# Patient Record
Sex: FEMALE | Race: WHITE | NOT HISPANIC OR LATINO | Employment: FULL TIME | URBAN - METROPOLITAN AREA
[De-identification: names, ages, dates, MRNs, and addresses within clinical notes are randomized per-mention and may not be internally consistent; named-entity substitution may affect disease eponyms.]

---

## 2018-04-05 ENCOUNTER — HOSPITAL ENCOUNTER (OUTPATIENT)
Dept: LAB | Facility: MEDICAL CENTER | Age: 28
End: 2018-04-05
Attending: INTERNAL MEDICINE

## 2018-04-05 LAB
ALBUMIN SERPL BCP-MCNC: 5.3 G/DL (ref 3.2–4.9)
ALBUMIN/GLOB SERPL: 1.5 G/DL
ALP SERPL-CCNC: 50 U/L (ref 30–99)
ALT SERPL-CCNC: 29 U/L (ref 2–50)
ANION GAP SERPL CALC-SCNC: 12 MMOL/L (ref 0–11.9)
AST SERPL-CCNC: 42 U/L (ref 12–45)
BASOPHILS # BLD AUTO: 0.4 % (ref 0–1.8)
BASOPHILS # BLD: 0.04 K/UL (ref 0–0.12)
BILIRUB SERPL-MCNC: 0.9 MG/DL (ref 0.1–1.5)
BUN SERPL-MCNC: 18 MG/DL (ref 8–22)
CALCIUM SERPL-MCNC: 10.3 MG/DL (ref 8.5–10.5)
CHLORIDE SERPL-SCNC: 101 MMOL/L (ref 96–112)
CHOLEST SERPL-MCNC: 180 MG/DL (ref 100–199)
CO2 SERPL-SCNC: 23 MMOL/L (ref 20–33)
CREAT SERPL-MCNC: 0.88 MG/DL (ref 0.5–1.4)
EOSINOPHIL # BLD AUTO: 0.05 K/UL (ref 0–0.51)
EOSINOPHIL NFR BLD: 0.6 % (ref 0–6.9)
ERYTHROCYTE [DISTWIDTH] IN BLOOD BY AUTOMATED COUNT: 41.8 FL (ref 35.9–50)
GLOBULIN SER CALC-MCNC: 3.5 G/DL (ref 1.9–3.5)
GLUCOSE SERPL-MCNC: 64 MG/DL (ref 65–99)
HCT VFR BLD AUTO: 48.2 % (ref 37–47)
HDLC SERPL-MCNC: 60 MG/DL
HGB BLD-MCNC: 16.1 G/DL (ref 12–16)
IMM GRANULOCYTES # BLD AUTO: 0.03 K/UL (ref 0–0.11)
IMM GRANULOCYTES NFR BLD AUTO: 0.3 % (ref 0–0.9)
LDLC SERPL CALC-MCNC: 106 MG/DL
LYMPHOCYTES # BLD AUTO: 1.18 K/UL (ref 1–4.8)
LYMPHOCYTES NFR BLD: 13 % (ref 22–41)
MCH RBC QN AUTO: 31.1 PG (ref 27–33)
MCHC RBC AUTO-ENTMCNC: 33.4 G/DL (ref 33.6–35)
MCV RBC AUTO: 93.1 FL (ref 81.4–97.8)
MONOCYTES # BLD AUTO: 0.42 K/UL (ref 0–0.85)
MONOCYTES NFR BLD AUTO: 4.6 % (ref 0–13.4)
NEUTROPHILS # BLD AUTO: 7.37 K/UL (ref 2–7.15)
NEUTROPHILS NFR BLD: 81.1 % (ref 44–72)
NRBC # BLD AUTO: 0 K/UL
NRBC BLD-RTO: 0 /100 WBC
PLATELET # BLD AUTO: 258 K/UL (ref 164–446)
PMV BLD AUTO: 9.3 FL (ref 9–12.9)
POTASSIUM SERPL-SCNC: 4.7 MMOL/L (ref 3.6–5.5)
PROT SERPL-MCNC: 8.8 G/DL (ref 6–8.2)
RBC # BLD AUTO: 5.18 M/UL (ref 4.2–5.4)
SODIUM SERPL-SCNC: 136 MMOL/L (ref 135–145)
TRIGL SERPL-MCNC: 72 MG/DL (ref 0–149)
TSH SERPL DL<=0.005 MIU/L-ACNC: 1.62 UIU/ML (ref 0.38–5.33)
WBC # BLD AUTO: 9.1 K/UL (ref 4.8–10.8)

## 2018-04-05 PROCEDURE — 36415 COLL VENOUS BLD VENIPUNCTURE: CPT

## 2018-04-05 PROCEDURE — 80053 COMPREHEN METABOLIC PANEL: CPT

## 2018-04-05 PROCEDURE — 85025 COMPLETE CBC W/AUTO DIFF WBC: CPT

## 2018-04-05 PROCEDURE — 84443 ASSAY THYROID STIM HORMONE: CPT

## 2018-04-05 PROCEDURE — 80061 LIPID PANEL: CPT

## 2019-01-12 ENCOUNTER — OFFICE VISIT (OUTPATIENT)
Dept: URGENT CARE | Facility: PHYSICIAN GROUP | Age: 29
End: 2019-01-12
Payer: COMMERCIAL

## 2019-01-12 VITALS
HEART RATE: 64 BPM | WEIGHT: 150 LBS | RESPIRATION RATE: 16 BRPM | OXYGEN SATURATION: 96 % | TEMPERATURE: 99.1 F | HEIGHT: 67 IN | BODY MASS INDEX: 23.54 KG/M2 | DIASTOLIC BLOOD PRESSURE: 68 MMHG | SYSTOLIC BLOOD PRESSURE: 110 MMHG

## 2019-01-12 DIAGNOSIS — N75.1 BARTHOLIN'S GLAND ABSCESS: ICD-10-CM

## 2019-01-12 PROCEDURE — 99214 OFFICE O/P EST MOD 30 MIN: CPT | Performed by: PHYSICIAN ASSISTANT

## 2019-01-12 RX ORDER — AMOXICILLIN 500 MG/1
500 CAPSULE ORAL 3 TIMES DAILY
COMMUNITY
End: 2019-03-08

## 2019-01-12 RX ORDER — SULFAMETHOXAZOLE AND TRIMETHOPRIM 800; 160 MG/1; MG/1
1 TABLET ORAL 2 TIMES DAILY
Qty: 14 TAB | Refills: 0 | Status: SHIPPED | OUTPATIENT
Start: 2019-01-12 | End: 2019-01-19

## 2019-01-14 ENCOUNTER — HOSPITAL ENCOUNTER (EMERGENCY)
Facility: MEDICAL CENTER | Age: 29
End: 2019-01-14
Attending: EMERGENCY MEDICINE
Payer: COMMERCIAL

## 2019-01-14 VITALS
HEART RATE: 60 BPM | RESPIRATION RATE: 16 BRPM | WEIGHT: 150 LBS | DIASTOLIC BLOOD PRESSURE: 78 MMHG | BODY MASS INDEX: 23.85 KG/M2 | TEMPERATURE: 96.9 F | SYSTOLIC BLOOD PRESSURE: 122 MMHG | OXYGEN SATURATION: 99 %

## 2019-01-14 DIAGNOSIS — Z76.89 ENCOUNTER FOR INCISION AND DRAINAGE PROCEDURE: ICD-10-CM

## 2019-01-14 DIAGNOSIS — N75.0 INFECTED CYST OF BARTHOLIN'S GLAND DUCT: ICD-10-CM

## 2019-01-14 LAB — HCG UR QL: NEGATIVE

## 2019-01-14 PROCEDURE — 303977 HCHG I & D

## 2019-01-14 PROCEDURE — 81025 URINE PREGNANCY TEST: CPT

## 2019-01-14 PROCEDURE — 700101 HCHG RX REV CODE 250: Performed by: EMERGENCY MEDICINE

## 2019-01-14 PROCEDURE — A9270 NON-COVERED ITEM OR SERVICE: HCPCS | Performed by: EMERGENCY MEDICINE

## 2019-01-14 PROCEDURE — 99284 EMERGENCY DEPT VISIT MOD MDM: CPT

## 2019-01-14 PROCEDURE — 700102 HCHG RX REV CODE 250 W/ 637 OVERRIDE(OP): Performed by: EMERGENCY MEDICINE

## 2019-01-14 RX ORDER — HYDROCODONE BITARTRATE AND ACETAMINOPHEN 5; 325 MG/1; MG/1
1 TABLET ORAL EVERY 4 HOURS PRN
Qty: 10 TAB | Refills: 0 | Status: SHIPPED | OUTPATIENT
Start: 2019-01-14 | End: 2019-01-18

## 2019-01-14 RX ORDER — IBUPROFEN 600 MG/1
600 TABLET ORAL ONCE
Status: COMPLETED | OUTPATIENT
Start: 2019-01-14 | End: 2019-01-14

## 2019-01-14 RX ORDER — CEPHALEXIN 500 MG/1
500 CAPSULE ORAL 2 TIMES DAILY
Qty: 20 CAP | Refills: 0 | Status: SHIPPED | OUTPATIENT
Start: 2019-01-14 | End: 2019-01-24

## 2019-01-14 RX ORDER — LIDOCAINE HYDROCHLORIDE 20 MG/ML
20 INJECTION, SOLUTION INFILTRATION; PERINEURAL ONCE
Status: COMPLETED | OUTPATIENT
Start: 2019-01-14 | End: 2019-01-14

## 2019-01-14 RX ORDER — OXYCODONE HYDROCHLORIDE AND ACETAMINOPHEN 5; 325 MG/1; MG/1
1 TABLET ORAL ONCE
Status: COMPLETED | OUTPATIENT
Start: 2019-01-14 | End: 2019-01-14

## 2019-01-14 RX ADMIN — LIDOCAINE HYDROCHLORIDE 20 ML: 20 INJECTION, SOLUTION INFILTRATION; PERINEURAL at 11:53

## 2019-01-14 RX ADMIN — OXYCODONE AND ACETAMINOPHEN 1 TABLET: 5; 325 TABLET ORAL at 12:25

## 2019-01-14 RX ADMIN — IBUPROFEN 600 MG: 600 TABLET, FILM COATED ORAL at 12:03

## 2019-01-14 ASSESSMENT — ENCOUNTER SYMPTOMS
NAUSEA: 0
COUGH: 0
CHILLS: 0
FEVER: 0
HEADACHES: 0
MYALGIAS: 0
ABDOMINAL PAIN: 0
TINGLING: 0
PALPITATIONS: 0
SHORTNESS OF BREATH: 0
BLURRED VISION: 0
VOMITING: 0
SORE THROAT: 0
CONSTIPATION: 0
EYE PAIN: 0
DIARRHEA: 0

## 2019-01-14 NOTE — ED PROVIDER NOTES
"ED Provider Note    CHIEF COMPLAINT  Chief Complaint   Patient presents with   • Abscess     \"I have a Bartholin cyst on my left side\"       HPI  Indu Callejas is a 28 y.o. female who presents for evaluation of vaginal cyst.  Patient relates past history of Bartholin cyst with development of an abscess.  She states over the last several days she developed increased pain and swelling in the left vaginal area.  Patient had no associated: Fever, chills, URI symptoms, cardiorespiratory symptoms, gastrointestinal symptoms.  She does not believe she is pregnant.  No other acute symptomatology or complaints.    REVIEW OF SYSTEMS  See HPI for further details.  She denies a history of: Diabetes, seizures, cardiopulmonary disorders, gastrointestinal disorders.  Patient has had a previous Bartholin cyst drained but did not have a Word catheter placed.  All other systems negative.    PAST MEDICAL HISTORY  Past Medical History:   Diagnosis Date   • HTN (hypertension) 10/2/2014   • Other specified symptom associated with female genital organs     dermoid cyst   • Pain     pelvis       FAMILY HISTORY  Family History   Problem Relation Age of Onset   • Hypertension Mother        SOCIAL HISTORY  Non-smoker; occasional alcohol use; positive marijuana use;    SURGICAL HISTORY  Past Surgical History:   Procedure Laterality Date   • PELVISCOPY  2/26/2010    [facility]   • OVARIAN CYSTECTOMY  2/26/2010    [facility]   • DILATION AND EVACUATION  2010   • ABDOMINAL EXPLORATION      Laparoscopic ovarian cystectomy for dermoid       CURRENT MEDICATIONS  Home Medications     Reviewed by Cony Abraham R.N. (Registered Nurse) on 01/14/19 at 1026  Med List Status: Not Addressed   Medication Last Dose Status   amoxicillin (AMOXIL) 500 MG Cap  Active   levonorgestrel-ethinyl estradiol (NORDETTE) 0.15-30 MG-MCG per tablet  Active "   sulfamethoxazole-trimethoprim (BACTRIM DS) 800-160 MG tablet  Active   valacyclovir (VALTREX) 1 GM Tab  Active                ALLERGIES  Allergies   Allergen Reactions   • Aluminum      All metals   • Cat Hair Extract    • Other Food      Cherries, apples   • Other Misc      dogs   • Soap        PHYSICAL EXAM  VITAL SIGNS: /74   Pulse 66   Temp 36.1 °C (96.9 °F) (Temporal)   Resp 14   Wt 68 kg (150 lb)   SpO2 100%   BMI 23.85 kg/m²    Constitutional: Well developed, Well nourished, No acute distress, Non-toxic appearance.   HENT: Normocephalic, Atraumatic, Nares:Clear, Oropharynx: moist, well hydrated, posterior pharynx:clear   Eyes: PERRL, EOMI, Conjunctiva normal, No discharge.   Neck: Normal range of motion, No tenderness, Supple, No stridor.   Lymphatic: No lymphadenopathy noted.   Cardiovascular: Regular rate and rhythm without mumurs, gallups, rubs   Thorax & Lungs: Normal Equal breath sounds, No respiratory distress, No wheezing, no stridor, no rales. No chest tenderness.   Abdomen: Soft, nontender, nondistended, no organomegaly, positive bowel sounds normal in quality. No guarding or rebound.  Vaginal examination performed with a female escort at the bedside; the patient has a large area of erythema, edema, round fluctuance over the left perivaginal region consistent with an infected Bartholin cyst abscess;  Skin: Good skin turgor, pink, warm, dry. No rashes, petechiae, purpura. Normal capillary refill.   Back: No tenderness, No CVA tenderness.   Neurologic: Alert & oriented x 3,  No gross focal deficits noted.   Psychiatric: Affect normal, Judgment normal, Mood normal.       RADIOLOGY/PROCEDURES  1.  Incision and drainage of Bartholin cyst abscess    COURSE & MEDICAL DECISION MAKING  Pertinent Labs & Imaging studies reviewed. (See chart for details)  Procedure note: The patient's vaginal area was prepped and draped in a sterile fashion.  The skin was cleansed with Betadine prep over the large  swollen fluctuant area.  The skin was anesthetized with 2% lidocaine.  Using #11 blade an incision was made over the mucosal surface.  Large amount of purulent material was expressed.  A Word catheter was placed and inflated and secured without any complications.    Discussion: At this time, patient presents with a Bartholin cyst abscess.  Treatment was initiated as noted above.  At this time, I discussed the findings and treatment plan with patient.  Patient is to follow-up with primary care or follow-up with gynecology on call.  Explained the need through have the catheter stay in place for an extended period of time to decrease the chance of reaccumulation and to allow for epithelialization of the Bartholin cyst cavity.  Patient indicates she is comfortable with this explanation disposition.    FINAL IMPRESSION  1. Infected cyst of Bartholin's gland duct    2. Encounter for incision and drainage procedure           PLAN  1.  Appropriate discharge instructions given  2.  Keflex 500 mg #20  3.  Lortab 5 mg #10;  databank reviewed; informed consent obtained;  4.  Follow-up with gynecology    Electronically signed by: Guy G Gansert, 1/14/2019 11:09 AM

## 2019-01-14 NOTE — PROGRESS NOTES
Subjective:      Indu Callejas is a 28 y.o. female who presents with Cyst (x 2 weeks// R upper thigh// pain )      HPI:  This is a new problem. Indu Callejas is a 28 y.o. female who presents today for evaluation of a left Bartholin's cyst/abscess.  Patient states that she has had these in the past, mainly on the right side but reports that she has had on the left before.  She said she has had it drained once before 2 years ago.  She said she started to notice it about 2 weeks ago when the last couple days it is got increasingly more painful.  She has been doing sitz baths at home but reports that has not provided much relief.  She said when she had it drained a couple years ago they never placed anything in it or do anything to prevent recurrence.  She denies any drainage, fever/chills, nausea/vomiting, chest pain, or shortness of breath.        Review of Systems   Constitutional: Negative for chills and fever.   HENT: Negative for congestion and sore throat.    Eyes: Negative for blurred vision and pain.   Respiratory: Negative for cough and shortness of breath.    Cardiovascular: Negative for chest pain and palpitations.   Gastrointestinal: Negative for abdominal pain, constipation, diarrhea, nausea and vomiting.   Genitourinary:        Bartholin cyst   Musculoskeletal: Negative for myalgias.   Neurological: Negative for tingling and headaches.       PMH:  has a past medical history of HTN (hypertension) (10/2/2014); Other specified symptom associated with female genital organs; and Pain. She also has no past medical history of Anemia; Blood transfusion; Clotting disorder ([facility]); Headache(784.0); IBD (inflammatory bowel disease); Migraine; Substance abuse ([facility]); or UTI of .  MEDS:   Current Outpatient Prescriptions:   •  amoxicillin (AMOXIL) 500 MG Cap, Take 500 mg by mouth 3 times a day., Disp: , Rfl:   •  sulfamethoxazole-trimethoprim (BACTRIM DS) 800-160 MG tablet, Take 1 Tab by mouth 2 times a day  "for 7 days., Disp: 14 Tab, Rfl: 0  •  valacyclovir (VALTREX) 1 GM Tab, Take 1 Tab by mouth 1 time daily as needed. (Patient not taking: Reported on 1/12/2019), Disp: 30 Tab, Rfl: 6  •  levonorgestrel-ethinyl estradiol (NORDETTE) 0.15-30 MG-MCG per tablet, Take 1 Tab by mouth every day. (Patient not taking: Reported on 1/12/2019), Disp: 28 Tab, Rfl: 7  ALLERGIES:   Allergies   Allergen Reactions   • Aluminum      All metals   • Cat Hair Extract    • Other Food      Cherries, apples   • Other Misc      dogs   • Soap      SURGHX:   Past Surgical History:   Procedure Laterality Date   • PELVISCOPY  2/26/2010    Performed by MIRNA VICTOR at [facility]   • OVARIAN CYSTECTOMY  2/26/2010    Performed by MIRNA VICTOR at [facility]   • DILATION AND EVACUATION  2010   • ABDOMINAL EXPLORATION      Laparoscopic ovarian cystectomy for dermoid     SOCHX:  reports that she has never smoked. She has never used smokeless tobacco. She reports that she drinks alcohol. She reports that she uses drugs, including Marijuana.  FH: Family history was reviewed, no pertinent findings to report'   Objective:     /68 (BP Location: Right arm, Patient Position: Sitting)   Pulse 64   Temp 37.3 °C (99.1 °F) (Temporal)   Resp 16   Ht 1.689 m (5' 6.5\")   Wt 68 kg (150 lb)   SpO2 96%   BMI 23.85 kg/m²      Physical Exam   Constitutional: She is oriented to person, place, and time. She appears well-developed and well-nourished.   HENT:   Head: Normocephalic and atraumatic.   Right Ear: External ear normal.   Left Ear: External ear normal.   Eyes: Pupils are equal, round, and reactive to light. Conjunctivae are normal.   Neck: Normal range of motion.   Cardiovascular: Normal rate, regular rhythm and normal heart sounds.    No murmur heard.  Pulmonary/Chest: Effort normal and breath sounds normal. She has no wheezes.   Genitourinary: Pelvic exam was performed with patient supine.   Genitourinary " Comments: Left bartholin gland abscess with fluctuance. No drainage noted   Lymphadenopathy:     She has no cervical adenopathy.   Neurological: She is alert and oriented to person, place, and time.   Skin: Skin is warm and dry. Capillary refill takes less than 2 seconds.   Psychiatric: She has a normal mood and affect. Her behavior is normal. Judgment normal.        Assessment/Plan:     1. Bartholin's gland abscess  - sulfamethoxazole-trimethoprim (BACTRIM DS) 800-160 MG tablet; Take 1 Tab by mouth 2 times a day for 7 days.  Dispense: 14 Tab; Refill: 0  -Discussed with patient that due to the fact that she has had a recurrence of this she should have it drained properly and have a Word catheter placed.  Advised that she should call her gynecologist to make an appointment with them for first thing next week or if her symptoms should worsen in the meantime she can go to the emergency department.      Differential Diagnosis, natural history, and supportive care discussed. Return to the Urgent Care or follow up with your PCP if symptoms fail to resolve, or for any new or worsening symptoms. Emergency room precautions discussed. Patient and/or family appears understanding of information.

## 2019-01-14 NOTE — ED NOTES
Pt arrives to triage via wheelchair with c/c Bartholin cyst x2 weeks.  Pt reports history of.  A&ox4.  Pt to lobby & advised to inform RN of any changes.

## 2019-01-30 ENCOUNTER — GYNECOLOGY VISIT (OUTPATIENT)
Dept: OBGYN | Facility: CLINIC | Age: 29
End: 2019-01-30
Payer: COMMERCIAL

## 2019-01-30 ENCOUNTER — HOSPITAL ENCOUNTER (OUTPATIENT)
Facility: MEDICAL CENTER | Age: 29
End: 2019-01-30
Attending: OBSTETRICS & GYNECOLOGY
Payer: COMMERCIAL

## 2019-01-30 VITALS
BODY MASS INDEX: 24.59 KG/M2 | HEIGHT: 66 IN | DIASTOLIC BLOOD PRESSURE: 66 MMHG | WEIGHT: 153 LBS | SYSTOLIC BLOOD PRESSURE: 108 MMHG

## 2019-01-30 DIAGNOSIS — Z12.4 SCREENING FOR CERVICAL CANCER: ICD-10-CM

## 2019-01-30 DIAGNOSIS — Z01.419 WELL WOMAN EXAM: ICD-10-CM

## 2019-01-30 DIAGNOSIS — Z11.51 SPECIAL SCREENING EXAMINATION FOR HUMAN PAPILLOMAVIRUS (HPV): ICD-10-CM

## 2019-01-30 DIAGNOSIS — Z32.01 POSITIVE PREGNANCY TEST: ICD-10-CM

## 2019-01-30 LAB
INT CON NEG: NEGATIVE
INT CON POS: POSITIVE
POC URINE PREGNANCY TEST: POSITIVE

## 2019-01-30 PROCEDURE — 87491 CHLMYD TRACH DNA AMP PROBE: CPT

## 2019-01-30 PROCEDURE — 88175 CYTOPATH C/V AUTO FLUID REDO: CPT

## 2019-01-30 PROCEDURE — 81025 URINE PREGNANCY TEST: CPT | Performed by: OBSTETRICS & GYNECOLOGY

## 2019-01-30 PROCEDURE — 87624 HPV HI-RISK TYP POOLED RSLT: CPT

## 2019-01-30 PROCEDURE — 99395 PREV VISIT EST AGE 18-39: CPT | Performed by: OBSTETRICS & GYNECOLOGY

## 2019-01-30 PROCEDURE — 87591 N.GONORRHOEAE DNA AMP PROB: CPT

## 2019-01-30 NOTE — PROGRESS NOTES
"Annual examination;  This patient is a 28 y.o. female  using nothing for birth control.        /66 (BP Location: Right arm, Patient Position: Sitting)   Ht 1.676 m (5' 6\")   Wt 69.4 kg (153 lb)   BMI 24.69 kg/m²     Physical examination;  Breast examination- No dominant masses, No skin retraction, No axillary adenopathy    Pelvic examination;  External genitalia-No visible lesions  Vagina-No blood or discharge  Cervix-No gross lesions, Pap smear taken  Uterus-Normal size and shape,  No tenderness  Adnexa No mass or tenderness    Urine hCG-positive    Impression;  Normal annual    Plan;  Needs DUB appointment in 1 month  Check PAP  Start Mammogram age 40 yrs.    "

## 2019-01-31 DIAGNOSIS — Z12.4 SCREENING FOR CERVICAL CANCER: ICD-10-CM

## 2019-01-31 DIAGNOSIS — Z01.419 WELL WOMAN EXAM: ICD-10-CM

## 2019-01-31 DIAGNOSIS — Z11.51 SPECIAL SCREENING EXAMINATION FOR HUMAN PAPILLOMAVIRUS (HPV): ICD-10-CM

## 2019-02-01 LAB
C TRACH DNA GENITAL QL NAA+PROBE: NEGATIVE
CYTOLOGY REG CYTOL: ABNORMAL
HPV HR 12 DNA CVX QL NAA+PROBE: POSITIVE
HPV16 DNA SPEC QL NAA+PROBE: NEGATIVE
HPV18 DNA SPEC QL NAA+PROBE: NEGATIVE
N GONORRHOEA DNA GENITAL QL NAA+PROBE: NEGATIVE
SPECIMEN SOURCE: ABNORMAL
SPECIMEN SOURCE: ABNORMAL

## 2019-02-06 ENCOUNTER — TELEPHONE (OUTPATIENT)
Dept: OBGYN | Facility: CLINIC | Age: 29
End: 2019-02-06

## 2019-02-06 NOTE — TELEPHONE ENCOUNTER
----- Message from Jaymie Marin sent at 2/6/2019 10:38 AM PST -----  Regarding: test results of pap  Contact: [phone]  She would like you to call with her test results of her Pap    Consulted w/Dr. Casanova, states pt needs to get a colposcopy due to abnormal cells, it won't affect pregnancy. Write down questions if any for her next appt.    Unable to contact pt, msg left to call back.    2/7/19 @ 0903 Unable to contact pt, msg left to call back.

## 2019-02-26 ENCOUNTER — GYNECOLOGY VISIT (OUTPATIENT)
Dept: OBGYN | Facility: CLINIC | Age: 29
End: 2019-02-26
Payer: COMMERCIAL

## 2019-02-26 VITALS
WEIGHT: 153 LBS | BODY MASS INDEX: 24.59 KG/M2 | HEIGHT: 66 IN | DIASTOLIC BLOOD PRESSURE: 62 MMHG | SYSTOLIC BLOOD PRESSURE: 124 MMHG

## 2019-02-26 DIAGNOSIS — N93.8 DUB (DYSFUNCTIONAL UTERINE BLEEDING): ICD-10-CM

## 2019-02-26 PROCEDURE — 76830 TRANSVAGINAL US NON-OB: CPT | Performed by: OBSTETRICS & GYNECOLOGY

## 2019-02-26 PROCEDURE — 99214 OFFICE O/P EST MOD 30 MIN: CPT | Mod: 25 | Performed by: OBSTETRICS & GYNECOLOGY

## 2019-02-26 NOTE — PROGRESS NOTES
Chief complaint;  This patient is a 28 y.o. female , No LMP recorded. presents complaining of dysfunctional uterine bleeding.    Review of systems-denies; chest pain, shortness of breath, fever, chills, abdominal pain  Past OB history-  OB History    Para Term  AB Living   1       1     SAB TAB Ectopic Molar Multiple Live Births                    # Outcome Date GA Lbr Felipe/2nd Weight Sex Delivery Anes PTL Lv   1 AB                 Past surgical history-   Past Surgical History:   Procedure Laterality Date   • PELVISCOPY  2010    [facility]   • OVARIAN CYSTECTOMY  2010    [facility]   • DILATION AND EVACUATION     • ABDOMINAL EXPLORATION      Laparoscopic ovarian cystectomy for dermoid     Past medical history-   Past Medical History:   Diagnosis Date   • HTN (hypertension) 10/2/2014   • Other specified symptom associated with female genital organs     dermoid cyst   • Pain     pelvis     Allergies- Aluminum; Cat hair extract; Other food; Other misc; and Soap  Present medications-   Outpatient Encounter Prescriptions as of 2019   Medication Sig Dispense Refill   • amoxicillin (AMOXIL) 500 MG Cap Take 500 mg by mouth 3 times a day.     • valacyclovir (VALTREX) 1 GM Tab Take 1 Tab by mouth 1 time daily as needed. (Patient not taking: Reported on 2019) 30 Tab 6   • levonorgestrel-ethinyl estradiol (NORDETTE) 0.15-30 MG-MCG per tablet Take 1 Tab by mouth every day. (Patient not taking: Reported on 2019) 28 Tab 7     No facility-administered encounter medications on file as of 2019.      Family history- no history of breast or ovarian cancer  Social history-   Social History     Social History   • Marital status: Single     Spouse name: N/A   • Number of children: N/A   • Years of education: N/A     Occupational History   • Not on file.     Social History Main  "Topics   • Smoking status: Never Smoker   • Smokeless tobacco: Never Used   • Alcohol use Yes      Comment: rare   • Drug use: Yes     Types: Marijuana   • Sexual activity: Yes     Partners: Male     Birth control/ protection: Pill     Other Topics Concern   • Not on file     Social History Narrative   • No narrative on file         Physical examination;   Alert and oriented x3  General-well-developed well-nourished female in no apparent distress  Vitals:    02/26/19 0839   BP: 124/62   Weight: 69.4 kg (153 lb)   Height: 1.676 m (5' 6\")     Skin is warm and dry   HEENT-normocephalic,nontraumatic,PERRLA,EOMI  Throat- no edema or erythema  Neck-supple  Cardiovascular-regular rate and rhythm, normal S1-S2 without murmurs or gallops  Lungs-clear to auscultation bilaterally  Back-negative CVA tenderness  Abdomen-nondistended positive bowel sounds soft nontender without masses or hepatosplenomegaly  Pelvic examination;  External genitalia-without lesions  Vagina-no blood, no discharge  Cervix-closed,no gross lesions  Uterus-  8 weeks size, nontender  Adnexa- without mass or tenderness  Extremities-without cyanosis clubbing or edema  Neurologic-grossly intact    Transvaginal ultrasound; as performed and read by myself; intrauterine gestational sac containing yin pregnancy positive fetal and cardiac motion crown-rump length consistent with 8 weeks 5 days, EDC 10/3/19, no free pelvic fluid, no obvious adnexal masses    Impression;  Dysfunctional uterine bleeding-no evidence of ectopic or missed    Plan;   Needs follow-up in 2 weeks      30 Minutes total spent with the patient in face-to-face contact,5 minutes of total time utilized to perform  Ultrasound, greater than 50% of the time has been spent on counseling and coordination of care. Many questions answered regarding possible miscarriage.      "

## 2019-03-07 ENCOUNTER — APPOINTMENT (OUTPATIENT)
Dept: OBGYN | Facility: CLINIC | Age: 29
End: 2019-03-07
Payer: COMMERCIAL

## 2019-03-08 ENCOUNTER — INITIAL PRENATAL (OUTPATIENT)
Dept: OBGYN | Facility: CLINIC | Age: 29
End: 2019-03-08
Payer: COMMERCIAL

## 2019-03-08 ENCOUNTER — HOSPITAL ENCOUNTER (OUTPATIENT)
Dept: LAB | Facility: MEDICAL CENTER | Age: 29
End: 2019-03-08
Attending: ADVANCED PRACTICE MIDWIFE
Payer: COMMERCIAL

## 2019-03-08 VITALS
SYSTOLIC BLOOD PRESSURE: 114 MMHG | HEIGHT: 66 IN | BODY MASS INDEX: 24.75 KG/M2 | DIASTOLIC BLOOD PRESSURE: 66 MMHG | WEIGHT: 154 LBS

## 2019-03-08 DIAGNOSIS — F41.9 ANXIETY: ICD-10-CM

## 2019-03-08 DIAGNOSIS — Z34.82 PRENATAL CARE, SUBSEQUENT PREGNANCY IN SECOND TRIMESTER: ICD-10-CM

## 2019-03-08 DIAGNOSIS — R20.2 PARESTHESIAS: ICD-10-CM

## 2019-03-08 DIAGNOSIS — Z86.19 H/O COLD SORES: ICD-10-CM

## 2019-03-08 LAB
ABO GROUP BLD: NORMAL
APPEARANCE UR: CLEAR
BACTERIA #/AREA URNS HPF: ABNORMAL /HPF
BASOPHILS # BLD AUTO: 0.5 % (ref 0–1.8)
BASOPHILS # BLD: 0.03 K/UL (ref 0–0.12)
BILIRUB UR QL STRIP.AUTO: NEGATIVE
BLD GP AB SCN SERPL QL: NORMAL
COLOR UR: YELLOW
EOSINOPHIL # BLD AUTO: 0.07 K/UL (ref 0–0.51)
EOSINOPHIL NFR BLD: 1.2 % (ref 0–6.9)
EPI CELLS #/AREA URNS HPF: ABNORMAL /HPF
ERYTHROCYTE [DISTWIDTH] IN BLOOD BY AUTOMATED COUNT: 42.5 FL (ref 35.9–50)
GLUCOSE UR STRIP.AUTO-MCNC: NEGATIVE MG/DL
HBV SURFACE AG SER QL: NEGATIVE
HCT VFR BLD AUTO: 41.9 % (ref 37–47)
HGB BLD-MCNC: 13.9 G/DL (ref 12–16)
HYALINE CASTS #/AREA URNS LPF: ABNORMAL /LPF
IMM GRANULOCYTES # BLD AUTO: 0.02 K/UL (ref 0–0.11)
IMM GRANULOCYTES NFR BLD AUTO: 0.3 % (ref 0–0.9)
KETONES UR STRIP.AUTO-MCNC: NEGATIVE MG/DL
LEUKOCYTE ESTERASE UR QL STRIP.AUTO: ABNORMAL
LYMPHOCYTES # BLD AUTO: 1.3 K/UL (ref 1–4.8)
LYMPHOCYTES NFR BLD: 22.5 % (ref 22–41)
MCH RBC QN AUTO: 30.5 PG (ref 27–33)
MCHC RBC AUTO-ENTMCNC: 33.2 G/DL (ref 33.6–35)
MCV RBC AUTO: 91.9 FL (ref 81.4–97.8)
MICRO URNS: ABNORMAL
MONOCYTES # BLD AUTO: 0.32 K/UL (ref 0–0.85)
MONOCYTES NFR BLD AUTO: 5.5 % (ref 0–13.4)
NEUTROPHILS # BLD AUTO: 4.03 K/UL (ref 2–7.15)
NEUTROPHILS NFR BLD: 70 % (ref 44–72)
NITRITE UR QL STRIP.AUTO: NEGATIVE
NRBC # BLD AUTO: 0 K/UL
NRBC BLD-RTO: 0 /100 WBC
PH UR STRIP.AUTO: 5.5 [PH]
PLATELET # BLD AUTO: 246 K/UL (ref 164–446)
PMV BLD AUTO: 8.8 FL (ref 9–12.9)
PROT UR QL STRIP: NEGATIVE MG/DL
RBC # BLD AUTO: 4.56 M/UL (ref 4.2–5.4)
RBC # URNS HPF: ABNORMAL /HPF
RBC UR QL AUTO: NEGATIVE
RH BLD: NORMAL
RUBV AB SER QL: 16.1 IU/ML
SP GR UR STRIP.AUTO: 1.01
TREPONEMA PALLIDUM IGG+IGM AB [PRESENCE] IN SERUM OR PLASMA BY IMMUNOASSAY: NON REACTIVE
UROBILINOGEN UR STRIP.AUTO-MCNC: 0.2 MG/DL
WBC # BLD AUTO: 5.8 K/UL (ref 4.8–10.8)
WBC #/AREA URNS HPF: ABNORMAL /HPF

## 2019-03-08 PROCEDURE — 59401 PR NEW OB VISIT: CPT | Performed by: ADVANCED PRACTICE MIDWIFE

## 2019-03-08 PROCEDURE — 87340 HEPATITIS B SURFACE AG IA: CPT

## 2019-03-08 PROCEDURE — 81001 URINALYSIS AUTO W/SCOPE: CPT

## 2019-03-08 PROCEDURE — 86850 RBC ANTIBODY SCREEN: CPT

## 2019-03-08 PROCEDURE — 36415 COLL VENOUS BLD VENIPUNCTURE: CPT

## 2019-03-08 PROCEDURE — 85025 COMPLETE CBC W/AUTO DIFF WBC: CPT

## 2019-03-08 PROCEDURE — 86762 RUBELLA ANTIBODY: CPT

## 2019-03-08 PROCEDURE — 86900 BLOOD TYPING SEROLOGIC ABO: CPT

## 2019-03-08 PROCEDURE — 87389 HIV-1 AG W/HIV-1&-2 AB AG IA: CPT

## 2019-03-08 PROCEDURE — 86780 TREPONEMA PALLIDUM: CPT

## 2019-03-08 PROCEDURE — 86901 BLOOD TYPING SEROLOGIC RH(D): CPT

## 2019-03-08 NOTE — PROGRESS NOTES
Subjective:   Indu Callejas is a 28 y.o.  who presents for her new OB exam.  She is 10w1d with an MARCE of Estimated Date of Delivery: 10/3/19 by US. She is feeling well and has no concerns at this time. Denies VB, LOF, contractions or pain. No ER visits or previous care in this pregnancy. Denies dysuria, vaginal DC, fever. Reports no fetal movement.  Declines CF. Of note, she had Bartholin cyst drainage for third time in January. Did have catheter placed with resolution.    Past Medical History:   Diagnosis Date   • Anxiety    • HTN (hypertension) 10/2/2014   • Other specified symptom associated with female genital organs     dermoid cyst   • Pain     pelvis       Psych Hx: Patient reports history of anxiety but not on meds for >3 years. She was on medication for approximately 6 months.     Past Surgical History:   Procedure Laterality Date   • PELVISCOPY  2010    Performed by MIRNA VICTOR at [facility]   • OVARIAN CYSTECTOMY  2010    Performed by MIRNA VICTOR at [facility]   • DILATION AND EVACUATION     • ABDOMINAL EXPLORATION      Laparoscopic ovarian cystectomy for dermoid        OB History    Para Term  AB Living   4 0 0 0 3     SAB TAB Ectopic Molar Multiple Live Births                    # Outcome Date GA Lbr Felipe/2nd Weight Sex Delivery Anes PTL Lv   4 Current            3 AB 18           2 AB 01/01/15           1 AB 12                   Gynecological Hx: History of HSV. Denies any vulvovaginal disorders and no hx of abnormal cervical cytology. Last pap 2019    Sexual Hx: One current male partner, who is  FOB     Contraceptive Hx: Has used pills in the past and has since discontinued use.     Family History   Problem Relation Age of Onset   • Hypertension Mother      Denies any genetic disorders in family history.     Social History     Social History   • Marital status: Single     Spouse name: N/A   • Number of  "children: N/A   • Years of education: N/A     Occupational History   • Not on file.     Social History Main Topics   • Smoking status: Never Smoker   • Smokeless tobacco: Never Used   • Alcohol use Yes      Comment: rare   • Drug use: Yes     Types: Marijuana   • Sexual activity: Yes     Partners: Male     Birth control/ protection: Pill     Other Topics Concern   • Not on file     Social History Narrative   • No narrative on file       FOB is involved and lives with Indu Callejas.  Pregnancy is un planned but desired.    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 FOB with 1 and 2 year old.  Denies alcohol use, drug use, or tobacco use in pregnancy.   Denies any current or hx of sexual, emotional or physical abuse or trauma.     Current Medications: PNV   Allergies: Denies allergies to medications, food avoidance of cherries, pears, apples, some nuts, or environmental allergies (cats). Sensitive to metals specifically aluminum (per dermatology)     Objective:      Vitals:    03/08/19 0901   BP: 114/66   Weight: 69.9 kg (154 lb)   Height: 1.676 m (5' 6\")        See Prenatal Physical and Prenatal Vitals  UA WNL today      Assessment:      1.  IUP @ 10w1d per US      2.  S=D      3.  See problem list as follows       Patient Active Problem List    Diagnosis Date Noted   • Paresthesias 10/02/2014     Priority: Medium     Class: Chronic   • Anxiety 10/02/2014     Priority: Medium     Class: Chronic   • Menstrual irregularity 03/31/2010     Priority: Low   • H/O cold sores 03/08/2019         Plan:   - Prenatal labs ordered - lab slip provided  - Patient oriented to practice and visit schedule  - NOB informational packet with anticipatory guidance given  - Discussed patient history of paraesthesias. These often correlated with " her anxiety. At this time monitor. I did review with patient our program for  mood disorders.   - We discussed her history of cold sores and she was reminded that self infection genitally can occur.   - S/sx of pregnancy warning signs and PTL precautions given  - Complete OB US in 9 wks  - Return to clinic in 4 weeks.

## 2019-03-09 LAB — HIV 1+2 AB+HIV1 P24 AG SERPL QL IA: NON REACTIVE

## 2019-03-28 ENCOUNTER — GYNECOLOGY VISIT (OUTPATIENT)
Dept: OBGYN | Facility: CLINIC | Age: 29
End: 2019-03-28
Payer: COMMERCIAL

## 2019-03-28 ENCOUNTER — HOSPITAL ENCOUNTER (OUTPATIENT)
Facility: MEDICAL CENTER | Age: 29
End: 2019-03-28
Attending: OBSTETRICS & GYNECOLOGY
Payer: COMMERCIAL

## 2019-03-28 VITALS — BODY MASS INDEX: 25.5 KG/M2 | DIASTOLIC BLOOD PRESSURE: 64 MMHG | WEIGHT: 158 LBS | SYSTOLIC BLOOD PRESSURE: 90 MMHG

## 2019-03-28 DIAGNOSIS — R87.610 ASCUS WITH POSITIVE HIGH RISK HPV CERVICAL: ICD-10-CM

## 2019-03-28 DIAGNOSIS — R87.810 ASCUS WITH POSITIVE HIGH RISK HPV CERVICAL: ICD-10-CM

## 2019-03-28 LAB — PATHOLOGY CONSULT NOTE: NORMAL

## 2019-03-28 PROCEDURE — 88305 TISSUE EXAM BY PATHOLOGIST: CPT | Mod: 59

## 2019-03-28 PROCEDURE — 57454 BX/CURETT OF CERVIX W/SCOPE: CPT | Performed by: OBSTETRICS & GYNECOLOGY

## 2019-03-28 NOTE — PROCEDURES
Procedure note; COLPOSCOPY    Indications; Pap smear; ASCUS with high risk HPV    Informed consent obtained, consent signed    Urine pregnancy test negative    Vaginal speculum placed    3% acetic acid solution applied to cervix    Coloscopy performed    Entire transformation zone visualized    Findings; acetowhite epithelium times 360 degrees  Mosaicism at 11:00 and 6:00 o'clock      Biopsies taken;    Endocervical curettage; taken  Ectocervical biopsies; 11:00 3:00 and 6:00 o'clock    Specimen sent to pathology    Followup after biopsy results available    Alexandre Casanova MD

## 2019-03-28 NOTE — LETTER
COLPOSCOPY / LEEP DATA SHEET    Indu Callejas     1990      28 y.o.      No LMP recorded. Patient is pregnant.     @EDC@       Medical history:   o MVP    o Blood dyscrasia    o Rh     o Other: .    STD history:    o HPV     o HSV     o HIV     o GC     o Chlamydia     o None     o Other: .    HIV:   o Positive     o Negative                            IV Drug User:   o  Yes    o  No .    Age of first coitus:                                                Number of sex partners in lifetime:  .    Reason for exam:.    Prior abnormal PAPS?    o  Yes  o  No        Treatment: .    Prior history of condyloma?    o  Yes  o  No        Treatment:.     Colposcopic view - description:                                 Satisfactory?    o  Yes  o  No                    Squamo-columnar junction seen?  o  Yes  o  No.    Entire lesion seen?     o  Yes  o  No          PAP done?  o  Yes  o  No           Biopsies done?  o  Yes  o  No.    Biopsy sites:.    ECC done?    o  Yes  o  No      If no, reason:.    Impression:.    Recommendations:.             Colposcopist: ______________________________________________ Date: ___________________

## 2019-03-29 ENCOUNTER — HOSPITAL ENCOUNTER (EMERGENCY)
Facility: MEDICAL CENTER | Age: 29
End: 2019-03-29
Attending: EMERGENCY MEDICINE
Payer: COMMERCIAL

## 2019-03-29 ENCOUNTER — APPOINTMENT (OUTPATIENT)
Dept: RADIOLOGY | Facility: MEDICAL CENTER | Age: 29
End: 2019-03-29
Attending: STUDENT IN AN ORGANIZED HEALTH CARE EDUCATION/TRAINING PROGRAM
Payer: COMMERCIAL

## 2019-03-29 ENCOUNTER — TELEPHONE (OUTPATIENT)
Dept: OBGYN | Facility: CLINIC | Age: 29
End: 2019-03-29

## 2019-03-29 VITALS
HEART RATE: 62 BPM | OXYGEN SATURATION: 100 % | DIASTOLIC BLOOD PRESSURE: 82 MMHG | SYSTOLIC BLOOD PRESSURE: 139 MMHG | BODY MASS INDEX: 25.16 KG/M2 | WEIGHT: 155.87 LBS | RESPIRATION RATE: 16 BRPM | TEMPERATURE: 96.8 F

## 2019-03-29 DIAGNOSIS — N93.9 VAGINAL BLEEDING: ICD-10-CM

## 2019-03-29 DIAGNOSIS — O20.0 THREATENED MISCARRIAGE IN EARLY PREGNANCY: ICD-10-CM

## 2019-03-29 LAB
ALBUMIN SERPL BCP-MCNC: 4.5 G/DL (ref 3.2–4.9)
ALBUMIN/GLOB SERPL: 1.5 G/DL
ALP SERPL-CCNC: 29 U/L (ref 30–99)
ALT SERPL-CCNC: 11 U/L (ref 2–50)
ANION GAP SERPL CALC-SCNC: 10 MMOL/L (ref 0–11.9)
APPEARANCE UR: CLEAR
AST SERPL-CCNC: 18 U/L (ref 12–45)
B-HCG SERPL-ACNC: ABNORMAL MIU/ML (ref 0–5)
BACTERIA #/AREA URNS HPF: NEGATIVE /HPF
BASOPHILS # BLD AUTO: 0.2 % (ref 0–1.8)
BASOPHILS # BLD: 0.02 K/UL (ref 0–0.12)
BILIRUB SERPL-MCNC: 0.3 MG/DL (ref 0.1–1.5)
BILIRUB UR QL STRIP.AUTO: NEGATIVE
BUN SERPL-MCNC: 10 MG/DL (ref 8–22)
CALCIUM SERPL-MCNC: 9.5 MG/DL (ref 8.5–10.5)
CHLORIDE SERPL-SCNC: 104 MMOL/L (ref 96–112)
CO2 SERPL-SCNC: 22 MMOL/L (ref 20–33)
COLOR UR: YELLOW
CREAT SERPL-MCNC: 0.58 MG/DL (ref 0.5–1.4)
EOSINOPHIL # BLD AUTO: 0.09 K/UL (ref 0–0.51)
EOSINOPHIL NFR BLD: 1.1 % (ref 0–6.9)
EPI CELLS #/AREA URNS HPF: ABNORMAL /HPF
ERYTHROCYTE [DISTWIDTH] IN BLOOD BY AUTOMATED COUNT: 41.6 FL (ref 35.9–50)
GLOBULIN SER CALC-MCNC: 3 G/DL (ref 1.9–3.5)
GLUCOSE SERPL-MCNC: 78 MG/DL (ref 65–99)
GLUCOSE UR STRIP.AUTO-MCNC: NEGATIVE MG/DL
HCG SERPL QL: POSITIVE
HCT VFR BLD AUTO: 42.1 % (ref 37–47)
HGB BLD-MCNC: 14.6 G/DL (ref 12–16)
HYALINE CASTS #/AREA URNS LPF: ABNORMAL /LPF
IMM GRANULOCYTES # BLD AUTO: 0.02 K/UL (ref 0–0.11)
IMM GRANULOCYTES NFR BLD AUTO: 0.2 % (ref 0–0.9)
KETONES UR STRIP.AUTO-MCNC: NEGATIVE MG/DL
LEUKOCYTE ESTERASE UR QL STRIP.AUTO: ABNORMAL
LYMPHOCYTES # BLD AUTO: 1.72 K/UL (ref 1–4.8)
LYMPHOCYTES NFR BLD: 21.2 % (ref 22–41)
MCH RBC QN AUTO: 30.8 PG (ref 27–33)
MCHC RBC AUTO-ENTMCNC: 34.7 G/DL (ref 33.6–35)
MCV RBC AUTO: 88.8 FL (ref 81.4–97.8)
MICRO URNS: ABNORMAL
MONOCYTES # BLD AUTO: 0.36 K/UL (ref 0–0.85)
MONOCYTES NFR BLD AUTO: 4.4 % (ref 0–13.4)
NEUTROPHILS # BLD AUTO: 5.91 K/UL (ref 2–7.15)
NEUTROPHILS NFR BLD: 72.9 % (ref 44–72)
NITRITE UR QL STRIP.AUTO: NEGATIVE
NRBC # BLD AUTO: 0 K/UL
NRBC BLD-RTO: 0 /100 WBC
NUMBER OF RH DOSES IND 8505RD: NORMAL
PH UR STRIP.AUTO: 6.5 [PH]
PLATELET # BLD AUTO: 256 K/UL (ref 164–446)
PMV BLD AUTO: 8.8 FL (ref 9–12.9)
POTASSIUM SERPL-SCNC: 3.8 MMOL/L (ref 3.6–5.5)
PROT SERPL-MCNC: 7.5 G/DL (ref 6–8.2)
PROT UR QL STRIP: NEGATIVE MG/DL
RBC # BLD AUTO: 4.74 M/UL (ref 4.2–5.4)
RBC # URNS HPF: ABNORMAL /HPF
RBC UR QL AUTO: ABNORMAL
RH BLD: NORMAL
SODIUM SERPL-SCNC: 136 MMOL/L (ref 135–145)
SP GR UR STRIP.AUTO: 1.02
UROBILINOGEN UR STRIP.AUTO-MCNC: 0.2 MG/DL
WBC # BLD AUTO: 8.1 K/UL (ref 4.8–10.8)
WBC #/AREA URNS HPF: ABNORMAL /HPF

## 2019-03-29 PROCEDURE — 76801 OB US < 14 WKS SINGLE FETUS: CPT

## 2019-03-29 PROCEDURE — 86901 BLOOD TYPING SEROLOGIC RH(D): CPT

## 2019-03-29 PROCEDURE — 84702 CHORIONIC GONADOTROPIN TEST: CPT

## 2019-03-29 PROCEDURE — 84703 CHORIONIC GONADOTROPIN ASSAY: CPT

## 2019-03-29 PROCEDURE — 80053 COMPREHEN METABOLIC PANEL: CPT

## 2019-03-29 PROCEDURE — 700102 HCHG RX REV CODE 250 W/ 637 OVERRIDE(OP): Performed by: EMERGENCY MEDICINE

## 2019-03-29 PROCEDURE — 99284 EMERGENCY DEPT VISIT MOD MDM: CPT

## 2019-03-29 PROCEDURE — 85025 COMPLETE CBC W/AUTO DIFF WBC: CPT

## 2019-03-29 PROCEDURE — 81001 URINALYSIS AUTO W/SCOPE: CPT

## 2019-03-29 PROCEDURE — A9270 NON-COVERED ITEM OR SERVICE: HCPCS | Performed by: EMERGENCY MEDICINE

## 2019-03-29 RX ORDER — ACETAMINOPHEN 325 MG/1
650 TABLET ORAL ONCE
Status: COMPLETED | OUTPATIENT
Start: 2019-03-29 | End: 2019-03-29

## 2019-03-29 RX ADMIN — ACETAMINOPHEN 650 MG: 325 TABLET, FILM COATED ORAL at 17:12

## 2019-03-29 ASSESSMENT — LIFESTYLE VARIABLES: DO YOU DRINK ALCOHOL: NO

## 2019-03-29 NOTE — ED NOTES
Pt states she had a biopsy procedure yesterday and proceeded to have bleeding following procedure. Pt states she is 13 weeks pregnant at this time.

## 2019-03-29 NOTE — TELEPHONE ENCOUNTER
Pt LM on VM stating she is bleeding and requesting a call back.   Called pt, states she had colposcopy yesterday and had some spotting which the doctor told her it would be norm al, pt states today she has been bleeding heavily and having cramping.  Consulted with Dr. Hendricks and said to find out if provider at the office can see her. Called [facility], she stated Dr. Celestin is full this afternoon and has no availability, notified Dr. Hendricks and he recommends patient to go to ER for evaluation.  Pt notified, voiced understanding and will comply. Pt had no other questions or concerns

## 2019-03-29 NOTE — ED PROVIDER NOTES
ED Provider Note    Scribed for Klaus Nuñez M.D. by Vicente Huizar. 3/29/2019  4:33 PM    Primary care provider: Pcp Pt States None  Means of arrival: Private Vehicle  History obtained from: Patient  History limited by: None.    CHIEF COMPLAINT  Chief Complaint   Patient presents with   • Vaginal Bleeding     post colposcopy yesterday, 13wks pregnant + lower abdominal pain onset today     HPI  Indu Callejas is a 28 y.o. female who presents to the Emergency Department who is 13 weeks pregnant complaining of vaginal bleeding.  Patient has associated nausea and abdominal cramping but no dizziness.  The bleeding volume is similar to a menstrual cycle.  The pregnancy was confirmed via ultrasound at 8 weeks and the most recent ultrasound was normal.  The patient's OB is Dr. Casanova. The patient has not felt the baby move yet.  The patient has a history of multiple pregnancies with abortions which were elective, most recently  in 2018.  She has had chlamydia in the past that was treated years ago.  She has had negative laboratory workup with this pregnancy for STDs.  The patient is currently taking amoxicillin for a sinus infection.  She has a history of dermoid and Bartholin cysts.      REVIEW OF SYSTEMS  Pertinent positives include vaginal bleeding, nausea, abdominal cramps. Pertinent negatives include no dizziness.  All other systems reviewed and negative.    PAST MEDICAL HISTORY   has a past medical history of Anxiety; Other specified symptom associated with female genital organs; and Pain.    SURGICAL HISTORY   has a past surgical history that includes dilation and evacuation (); pelviscopy (2010); ovarian cystectomy (2010); and abdominal exploration.    SOCIAL HISTORY  Social History   Substance Use Topics   • Smoking status: Never Smoker   • Smokeless tobacco: Never Used   • Alcohol use Yes      Comment: rare      History   Drug Use   • Types: Marijuana       FAMILY HISTORY  Family  History   Problem Relation Age of Onset   • Hypertension Mother      CURRENT MEDICATIONS  Home Medications     Reviewed by Ling Schroeder R.N. (Registered Nurse) on 03/29/19 at 1631  Med List Status: Complete   Medication Last Dose Status   AMOXICILLIN PO 3/29/2019 Active   Prenatal MV-Min-Fe Fum-FA-DHA (PRENATAL 1 PO) 3/29/2019 Active              ALLERGIES  Allergies   Allergen Reactions   • Aluminum      All metals   • Cat Hair Extract    • Other Food      Cherries, apples   • Other Misc      dogs   • Soap      PHYSICAL EXAM  VITAL SIGNS: /82   Pulse 69   Temp 36.4 °C (97.6 °F) (Temporal)   Resp 16   Wt 70.7 kg (155 lb 13.8 oz)   SpO2 98%   BMI 25.16 kg/m²     Vital signs reviewed.  Constitutional:  Well developed, well nourished 28 year old female.  No distress.  Head: Atraumatic  Neck: Supple, normal range of motion.  Cardiovascular: Regular rate and rhythm.  No murmur.  Pulmonary/Chest: Normal respiratory effort, lungs clear to auscultation.  Abdominal: Soft, non-tender, no rebound, no guarding.  Musculoskeletal: Normal range of motion in all major joints.  Neurological: Nonfocal  Skin: Warm, dry.  Tattoo to the right wrist.    Psychiatric: Normal Affect.    LABS  Results for orders placed or performed during the hospital encounter of 03/29/19   CBC with Differential   Result Value Ref Range    WBC 8.1 4.8 - 10.8 K/uL    RBC 4.74 4.20 - 5.40 M/uL    Hemoglobin 14.6 12.0 - 16.0 g/dL    Hematocrit 42.1 37.0 - 47.0 %    MCV 88.8 81.4 - 97.8 fL    MCH 30.8 27.0 - 33.0 pg    MCHC 34.7 33.6 - 35.0 g/dL    RDW 41.6 35.9 - 50.0 fL    Platelet Count 256 164 - 446 K/uL    MPV 8.8 (L) 9.0 - 12.9 fL    Neutrophils-Polys 72.90 (H) 44.00 - 72.00 %    Lymphocytes 21.20 (L) 22.00 - 41.00 %    Monocytes 4.40 0.00 - 13.40 %    Eosinophils 1.10 0.00 - 6.90 %    Basophils 0.20 0.00 - 1.80 %    Immature Granulocytes 0.20 0.00 - 0.90 %    Nucleated RBC 0.00 /100 WBC    Neutrophils (Absolute) 5.91 2.00 - 7.15 K/uL     Lymphs (Absolute) 1.72 1.00 - 4.80 K/uL    Monos (Absolute) 0.36 0.00 - 0.85 K/uL    Eos (Absolute) 0.09 0.00 - 0.51 K/uL    Baso (Absolute) 0.02 0.00 - 0.12 K/uL    Immature Granulocytes (abs) 0.02 0.00 - 0.11 K/uL    NRBC (Absolute) 0.00 K/uL   HCG Qual Serum   Result Value Ref Range    Beta-Hcg Qualitative Serum Positive (A) Negative   URINALYSIS,CULTURE IF INDICATED   Result Value Ref Range    Color Yellow     Character Clear     Specific Gravity 1.017 <1.035    Ph 6.5 5.0 - 8.0    Glucose Negative Negative mg/dL    Ketones Negative Negative mg/dL    Protein Negative Negative mg/dL    Bilirubin Negative Negative    Urobilinogen, Urine 0.2 Negative    Nitrite Negative Negative    Leukocyte Esterase Small (A) Negative    Occult Blood Large (A) Negative    Micro Urine Req Microscopic    URINE MICROSCOPIC (W/UA)   Result Value Ref Range    WBC 5-10 (A) /hpf    RBC 20-50 (A) /hpf    Bacteria Negative None /hpf    Epithelial Cells Few /hpf    Hyaline Cast 3-5 (A) /lpf   RH TYPE FOR RHOGAM FROM E.D.   Result Value Ref Range    Emergency Department Rh Typing POS     Number Of Rh Doses Indicated ZERO       All labs reviewed by me.    RADIOLOGY  US-OB 1ST TRIMESTER WITH TRANSVAGINAL (COMBO)   Final Result      1.  Viable single intrauterine gestation of an estimated 12 weeks 5 days gestational age with an estimated date of delivery 10/6/2019.        The radiologist's interpretation of all radiological studies have been reviewed by me.    COURSE & MEDICAL DECISION MAKING  Pertinent Labs & Imaging studies reviewed. (See chart for details) The patient's [facility] and past medical  records were reviewed    4:33 PM - Patient seen and examined at bedside. Ordered OB ultrasound to evaluate for possibility of spontaneous , Rh factor, quantitative beta-hCG. The differential diagnoses include but are not limited to: Spontaneous , post colposcopy bleeding, molar pregnancy. I have discussed the potential for  aborted pregnancy as this is common with vaginal bleeding during pregnancy.  The patient understands the plan of care and agrees to proceed.    5:57 PM - ultrasound with viable intrauterine pregnancy.    6:11 PM -spoke with Dr. Castillo on the phone regarding patient follow-up.  He recommended that the patient be seen at [facility] next week given her overall bleeding and recent colposcopy.  Will discuss this with the patient and then discharge after return precautions discussed.  Patient has a follow-up appointment set up for April 3 and [facility].    The patient will return for new or worsening symptoms and is stable at the time of discharge.    The patient is referred to a primary physician for blood pressure management, diabetic screening, and for all other preventative health concerns.    DISPOSITION:  Patient will be discharged home in stable condition.    FOLLOW UP:  No follow-up provider specified.    OUTPATIENT MEDICATIONS:  Discharge Medication List as of 3/29/2019  6:33 PM        FINAL IMPRESSION  3 Post colposcopy cervical bleeding  1. Vaginal bleeding Temporary   2. Threatened miscarriage in early pregnancy           Vicente GUILLEN (Josie), am scribing for, and in the presence of, Klaus Nuñez M.D..    Electronically signed by: Vicente Huizar (Josie), 3/29/2019    Klaus GUILLEN M.D. personally performed the services described in this documentation, as scribed by Vicente Huizar in my presence, and it is both accurate and complete.  C.    The note accurately reflects work and decisions made by me.  Klaus Nuñez  3/29/2019  8:13 PM

## 2019-03-30 NOTE — ED NOTES
Pt resting on gurney, family at the bedside. Pt updated on plan of care, no further questions at this time.

## 2019-03-30 NOTE — ED NOTES
Pt discharged; Pt verbalized understanding of discharge instructions as well as symptoms for which to return to the ER. Pt ambulated to the lobby with steady gait accompanied by family.

## 2019-03-30 NOTE — DISCHARGE INSTRUCTIONS
Patient will follow-up with [facility] as scheduled on 3 April.  Return precautions have been discussed and patient agrees to this plan of treatment.

## 2019-04-01 ENCOUNTER — TELEPHONE (OUTPATIENT)
Dept: OBGYN | Facility: CLINIC | Age: 29
End: 2019-04-01

## 2019-04-03 ENCOUNTER — ROUTINE PRENATAL (OUTPATIENT)
Dept: OBGYN | Facility: CLINIC | Age: 29
End: 2019-04-03
Payer: COMMERCIAL

## 2019-04-03 VITALS — SYSTOLIC BLOOD PRESSURE: 110 MMHG | DIASTOLIC BLOOD PRESSURE: 70 MMHG | BODY MASS INDEX: 25.34 KG/M2 | WEIGHT: 157 LBS

## 2019-04-03 DIAGNOSIS — R87.620 ASCUS WITH POSITIVE HIGH RISK HUMAN PAPILLOMAVIRUS OF VAGINA: ICD-10-CM

## 2019-04-03 DIAGNOSIS — R87.811 ASCUS WITH POSITIVE HIGH RISK HUMAN PAPILLOMAVIRUS OF VAGINA: ICD-10-CM

## 2019-04-03 DIAGNOSIS — Z34.82 ENCOUNTER FOR SUPERVISION OF OTHER NORMAL PREGNANCY, SECOND TRIMESTER: ICD-10-CM

## 2019-04-03 PROCEDURE — 90040 PR PRENATAL FOLLOW UP: CPT | Performed by: OBSTETRICS & GYNECOLOGY

## 2019-04-03 NOTE — PROGRESS NOTES
Pt here today for OB follow up @ 13w6d  Pt denies vaginal bleeding or cramps/contractions  Good # verified  Pharmacy Confirmed.  AFP Lab slip given to pt, would go home and think about whether or not she will performed the lab test.

## 2019-04-03 NOTE — PROGRESS NOTES
S: Pt presents for routine OB follow up.  No contractions, vaginal bleeding, or leaking fluids. Reports no fetal movements.  Patient states she had some bleeding right after the colposcopy procedure was seen and the bleeding is completely resolved.  She is Rh+.  Patient has appointment for level 2 ultrasound scheduled.  We discussed screening for birth defects and chromosomal disorders-patient requested quad screen order in lab slips were given along with brochures.  Patient states she is contemplating whether or not she will have the test performed.    Questions answered.    O: /70   Wt 71.2 kg (157 lb)   BMI 25.34 kg/m²   Patients' weight gain, fluid intake and exercise level discussed.  Vitals, fundal height , fetal position, and FHR reviewed on flowsheet    Lab:  Recent Results (from the past 336 hour(s))   Histology Request    Collection Time: 03/28/19  7:12 PM   Result Value Ref Range    Pathology Request Sent to Histo    URINALYSIS,CULTURE IF INDICATED    Collection Time: 03/29/19  4:11 PM   Result Value Ref Range    Color Yellow     Character Clear     Specific Gravity 1.017 <1.035    Ph 6.5 5.0 - 8.0    Glucose Negative Negative mg/dL    Ketones Negative Negative mg/dL    Protein Negative Negative mg/dL    Bilirubin Negative Negative    Urobilinogen, Urine 0.2 Negative    Nitrite Negative Negative    Leukocyte Esterase Small (A) Negative    Occult Blood Large (A) Negative    Micro Urine Req Microscopic    URINE MICROSCOPIC (W/UA)    Collection Time: 03/29/19  4:11 PM   Result Value Ref Range    WBC 5-10 (A) /hpf    RBC 20-50 (A) /hpf    Bacteria Negative None /hpf    Epithelial Cells Few /hpf    Hyaline Cast 3-5 (A) /lpf   CBC with Differential    Collection Time: 03/29/19  4:58 PM   Result Value Ref Range    WBC 8.1 4.8 - 10.8 K/uL    RBC 4.74 4.20 - 5.40 M/uL    Hemoglobin 14.6 12.0 - 16.0 g/dL    Hematocrit 42.1 37.0 - 47.0 %    MCV 88.8 81.4 - 97.8 fL    MCH 30.8 27.0 - 33.0 pg    MCHC 34.7 33.6  - 35.0 g/dL    RDW 41.6 35.9 - 50.0 fL    Platelet Count 256 164 - 446 K/uL    MPV 8.8 (L) 9.0 - 12.9 fL    Neutrophils-Polys 72.90 (H) 44.00 - 72.00 %    Lymphocytes 21.20 (L) 22.00 - 41.00 %    Monocytes 4.40 0.00 - 13.40 %    Eosinophils 1.10 0.00 - 6.90 %    Basophils 0.20 0.00 - 1.80 %    Immature Granulocytes 0.20 0.00 - 0.90 %    Nucleated RBC 0.00 /100 WBC    Neutrophils (Absolute) 5.91 2.00 - 7.15 K/uL    Lymphs (Absolute) 1.72 1.00 - 4.80 K/uL    Monos (Absolute) 0.36 0.00 - 0.85 K/uL    Eos (Absolute) 0.09 0.00 - 0.51 K/uL    Baso (Absolute) 0.02 0.00 - 0.12 K/uL    Immature Granulocytes (abs) 0.02 0.00 - 0.11 K/uL    NRBC (Absolute) 0.00 K/uL   Comp Metabolic Panel    Collection Time: 19  4:58 PM   Result Value Ref Range    Sodium 136 135 - 145 mmol/L    Potassium 3.8 3.6 - 5.5 mmol/L    Chloride 104 96 - 112 mmol/L    Co2 22 20 - 33 mmol/L    Anion Gap 10.0 0.0 - 11.9    Glucose 78 65 - 99 mg/dL    Bun 10 8 - 22 mg/dL    Creatinine 0.58 0.50 - 1.40 mg/dL    Calcium 9.5 8.5 - 10.5 mg/dL    AST(SGOT) 18 12 - 45 U/L    ALT(SGPT) 11 2 - 50 U/L    Alkaline Phosphatase 29 (L) 30 - 99 U/L    Total Bilirubin 0.3 0.1 - 1.5 mg/dL    Albumin 4.5 3.2 - 4.9 g/dL    Total Protein 7.5 6.0 - 8.2 g/dL    Globulin 3.0 1.9 - 3.5 g/dL    A-G Ratio 1.5 g/dL   HCG Qual Serum    Collection Time: 19  4:58 PM   Result Value Ref Range    Beta-Hcg Qualitative Serum Positive (A) Negative   BETA-HCG QUANTITATIVE SERUM    Collection Time: 19  4:58 PM   Result Value Ref Range    Bhcg 15566.1 (H) 0.0 - 5.0 mIU/mL   RH TYPE FOR RHOGAM FROM E.D.    Collection Time: 19  4:58 PM   Result Value Ref Range    Emergency Department Rh Typing POS     Number Of Rh Doses Indicated ZERO    ESTIMATED GFR    Collection Time: 19  4:58 PM   Result Value Ref Range    GFR If African American >60 >60 mL/min/1.73 m 2    GFR If Non African American >60 >60 mL/min/1.73 m 2       A/P:  28 y.o.  at 13w6d presents for  routine obstetric follow-up.  Size equals dates   Encounter Diagnoses   Name Primary?   • Encounter for supervision of other normal pregnancy, second trimester    • ASCUS with positive high risk human papillomavirus of vagina        1.  Continue prenatal vitamins.  2.  Begin kick counts at 28 weeks.  3.  Exercise at least 30 minutes daily.  4.  Drink at least 2 Liters of water daily  5.  Follow-up in 4 weeks.  6.  Pregnancy precautions and plan of care reviewed  7.  Patient states she has follow-up from her colposcopy procedure next week to discuss results

## 2019-05-08 ENCOUNTER — ROUTINE PRENATAL (OUTPATIENT)
Dept: OBGYN | Facility: CLINIC | Age: 29
End: 2019-05-08
Payer: COMMERCIAL

## 2019-05-08 VITALS — BODY MASS INDEX: 25.66 KG/M2 | WEIGHT: 159 LBS | SYSTOLIC BLOOD PRESSURE: 112 MMHG | DIASTOLIC BLOOD PRESSURE: 66 MMHG

## 2019-05-08 DIAGNOSIS — Z34.82 ENCOUNTER FOR SUPERVISION OF OTHER NORMAL PREGNANCY, SECOND TRIMESTER: ICD-10-CM

## 2019-05-08 PROCEDURE — 90040 PR PRENATAL FOLLOW UP: CPT | Performed by: OBSTETRICS & GYNECOLOGY

## 2019-05-08 NOTE — PROGRESS NOTES
OB Followup;    18w6d    Patient Active Problem List    Diagnosis Date Noted   • Paresthesias 10/02/2014     Priority: Medium     Class: Chronic   • Anxiety 10/02/2014     Priority: Medium     Class: Chronic   • Menstrual irregularity 03/31/2010     Priority: Low   • Encounter for supervision of other normal pregnancy, second trimester 04/03/2019   • ASCUS with positive high risk human papillomavirus of vagina 04/03/2019   • H/O cold sores 03/08/2019       Vitals:    05/08/19 1110   BP: 112/66   Weight: 72.1 kg (159 lb)       Patient presents for followup of OB care. Currently doing well . Good fetal movement no leakage of fluid no contractions or vaginal bleeding        Size equals dates, normal fetal heart rate      Labs; patient declines AFP tetra testing  OB fetal survey ultrasound scheduled       Discussed results of colposcopy which shows HPV but no other abnormality      Labor precautions given  Increase oral hydration  Discussed proper weight gain during pregnancy  Continue prenatal vitamins  Increase water intake  Reviewed our group's policy on prenatal visits with all providers in the group    Followup in  4 weeks

## 2019-05-20 ENCOUNTER — HOSPITAL ENCOUNTER (OUTPATIENT)
Dept: RADIOLOGY | Facility: MEDICAL CENTER | Age: 29
End: 2019-05-20
Attending: ADVANCED PRACTICE MIDWIFE
Payer: COMMERCIAL

## 2019-05-20 DIAGNOSIS — Z34.82 PRENATAL CARE, SUBSEQUENT PREGNANCY IN SECOND TRIMESTER: ICD-10-CM

## 2019-05-20 PROCEDURE — 76805 OB US >/= 14 WKS SNGL FETUS: CPT

## 2020-11-05 ENCOUNTER — HOSPITAL ENCOUNTER (INPATIENT)
Dept: HOSPITAL 8 - LDOP | Age: 30
LOS: 2 days | Discharge: HOME | End: 2020-11-07
Attending: OBSTETRICS & GYNECOLOGY | Admitting: OBSTETRICS & GYNECOLOGY
Payer: COMMERCIAL

## 2020-11-05 VITALS — DIASTOLIC BLOOD PRESSURE: 79 MMHG | SYSTOLIC BLOOD PRESSURE: 133 MMHG

## 2020-11-05 VITALS — BODY MASS INDEX: 30.92 KG/M2 | HEIGHT: 66 IN | WEIGHT: 192.4 LBS

## 2020-11-05 VITALS — DIASTOLIC BLOOD PRESSURE: 83 MMHG | SYSTOLIC BLOOD PRESSURE: 123 MMHG

## 2020-11-05 DIAGNOSIS — Z20.828: ICD-10-CM

## 2020-11-05 DIAGNOSIS — Z3A.40: ICD-10-CM

## 2020-11-05 LAB
BASOPHILS # BLD AUTO: 0.1 X10^3/UL (ref 0–0.1)
BASOPHILS NFR BLD AUTO: 1 % (ref 0–1)
EOSINOPHIL # BLD AUTO: 0.1 X10^3/UL (ref 0–0.4)
EOSINOPHIL NFR BLD AUTO: 1 % (ref 1–7)
ERYTHROCYTE [DISTWIDTH] IN BLOOD BY AUTOMATED COUNT: 13.9 % (ref 9.6–15.2)
LYMPHOCYTES # BLD AUTO: 1.9 X10^3/UL (ref 1–3.4)
LYMPHOCYTES NFR BLD AUTO: 26 % (ref 22–44)
MCH RBC QN AUTO: 30.3 PG (ref 27–34.8)
MCHC RBC AUTO-ENTMCNC: 33.6 G/DL (ref 32.4–35.8)
MD: NO
MONOCYTES # BLD AUTO: 0.4 X10^3/UL (ref 0.2–0.8)
MONOCYTES NFR BLD AUTO: 5 % (ref 2–9)
NEUTROPHILS # BLD AUTO: 5.1 X10^3/UL (ref 1.8–6.8)
NEUTROPHILS NFR BLD AUTO: 67 % (ref 42–75)
PLATELET # BLD AUTO: 222 X10^3/UL (ref 130–400)
PMV BLD AUTO: 8.2 FL (ref 7.4–10.4)
RBC # BLD AUTO: 4.28 X10^6/UL (ref 3.82–5.3)

## 2020-11-05 PROCEDURE — 36415 COLL VENOUS BLD VENIPUNCTURE: CPT

## 2020-11-05 PROCEDURE — 89060 EXAM SYNOVIAL FLUID CRYSTALS: CPT

## 2020-11-05 PROCEDURE — 87635 SARS-COV-2 COVID-19 AMP PRB: CPT

## 2020-11-05 PROCEDURE — 85025 COMPLETE CBC W/AUTO DIFF WBC: CPT

## 2020-11-05 PROCEDURE — 86592 SYPHILIS TEST NON-TREP QUAL: CPT

## 2020-11-05 PROCEDURE — 86850 RBC ANTIBODY SCREEN: CPT

## 2020-11-05 PROCEDURE — 86900 BLOOD TYPING SEROLOGIC ABO: CPT

## 2020-11-05 RX ADMIN — SODIUM CHLORIDE, SODIUM LACTATE, POTASSIUM CHLORIDE, AND CALCIUM CHLORIDE SCH MLS/HR: .6; .31; .03; .02 INJECTION, SOLUTION INTRAVENOUS at 23:42

## 2020-11-05 RX ADMIN — SODIUM CHLORIDE, SODIUM LACTATE, POTASSIUM CHLORIDE, AND CALCIUM CHLORIDE SCH MLS/HR: .6; .31; .03; .02 INJECTION, SOLUTION INTRAVENOUS at 18:45

## 2020-11-05 RX ADMIN — SODIUM CHLORIDE, SODIUM LACTATE, POTASSIUM CHLORIDE, CALCIUM CHLORIDE, AND DEXTROSE MONOHYDRATE SCH MLS/HR: 600; 310; 30; 20; 5 INJECTION, SOLUTION INTRAVENOUS at 19:53

## 2020-11-06 VITALS — DIASTOLIC BLOOD PRESSURE: 79 MMHG | SYSTOLIC BLOOD PRESSURE: 119 MMHG

## 2020-11-06 VITALS — SYSTOLIC BLOOD PRESSURE: 117 MMHG | DIASTOLIC BLOOD PRESSURE: 69 MMHG

## 2020-11-06 VITALS — DIASTOLIC BLOOD PRESSURE: 77 MMHG | SYSTOLIC BLOOD PRESSURE: 110 MMHG

## 2020-11-06 VITALS — DIASTOLIC BLOOD PRESSURE: 71 MMHG | SYSTOLIC BLOOD PRESSURE: 108 MMHG

## 2020-11-06 LAB
BASOPHILS # BLD AUTO: 0 X10^3/UL (ref 0–0.1)
BASOPHILS NFR BLD AUTO: 0 % (ref 0–1)
EOSINOPHIL # BLD AUTO: 0.1 X10^3/UL (ref 0–0.4)
EOSINOPHIL NFR BLD AUTO: 1 % (ref 1–7)
ERYTHROCYTE [DISTWIDTH] IN BLOOD BY AUTOMATED COUNT: 13.6 % (ref 9.6–15.2)
LYMPHOCYTES # BLD AUTO: 1.8 X10^3/UL (ref 1–3.4)
LYMPHOCYTES NFR BLD AUTO: 17 % (ref 22–44)
MCH RBC QN AUTO: 30.5 PG (ref 27–34.8)
MCHC RBC AUTO-ENTMCNC: 34.4 G/DL (ref 32.4–35.8)
MD: NO
MONOCYTES # BLD AUTO: 0.7 X10^3/UL (ref 0.2–0.8)
MONOCYTES NFR BLD AUTO: 7 % (ref 2–9)
NEUTROPHILS # BLD AUTO: 7.8 X10^3/UL (ref 1.8–6.8)
NEUTROPHILS NFR BLD AUTO: 75 % (ref 42–75)
PLATELET # BLD AUTO: 190 X10^3/UL (ref 130–400)
PMV BLD AUTO: 8 FL (ref 7.4–10.4)
RBC # BLD AUTO: 3.73 X10^6/UL (ref 3.82–5.3)

## 2020-11-06 PROCEDURE — 00HU33Z INSERTION OF INFUSION DEVICE INTO SPINAL CANAL, PERCUTANEOUS APPROACH: ICD-10-PCS | Performed by: OBSTETRICS & GYNECOLOGY

## 2020-11-06 PROCEDURE — 3E0R3BZ INTRODUCTION OF ANESTHETIC AGENT INTO SPINAL CANAL, PERCUTANEOUS APPROACH: ICD-10-PCS | Performed by: OBSTETRICS & GYNECOLOGY

## 2020-11-06 PROCEDURE — 0UQMXZZ REPAIR VULVA, EXTERNAL APPROACH: ICD-10-PCS | Performed by: OBSTETRICS & GYNECOLOGY

## 2020-11-06 RX ADMIN — IBUPROFEN PRN MG: 600 TABLET ORAL at 16:58

## 2020-11-06 RX ADMIN — OXYCODONE HYDROCHLORIDE AND ACETAMINOPHEN PRN TAB: 5; 325 TABLET ORAL at 10:49

## 2020-11-06 RX ADMIN — OXYCODONE HYDROCHLORIDE AND ACETAMINOPHEN PRN TAB: 5; 325 TABLET ORAL at 23:02

## 2020-11-06 RX ADMIN — IBUPROFEN PRN MG: 600 TABLET ORAL at 10:49

## 2020-11-06 RX ADMIN — IBUPROFEN PRN MG: 600 TABLET ORAL at 23:02

## 2020-11-06 RX ADMIN — OXYCODONE HYDROCHLORIDE AND ACETAMINOPHEN PRN TAB: 5; 325 TABLET ORAL at 17:56

## 2020-11-06 RX ADMIN — SODIUM CHLORIDE, SODIUM LACTATE, POTASSIUM CHLORIDE, CALCIUM CHLORIDE, AND DEXTROSE MONOHYDRATE SCH MLS/HR: 600; 310; 30; 20; 5 INJECTION, SOLUTION INTRAVENOUS at 03:37

## 2020-11-06 RX ADMIN — Medication SCH MLS/HR: at 17:30

## 2020-11-06 RX ADMIN — SODIUM CHLORIDE, SODIUM LACTATE, POTASSIUM CHLORIDE, AND CALCIUM CHLORIDE SCH MLS/HR: .6; .31; .03; .02 INJECTION, SOLUTION INTRAVENOUS at 16:30

## 2020-11-07 VITALS — DIASTOLIC BLOOD PRESSURE: 84 MMHG | SYSTOLIC BLOOD PRESSURE: 124 MMHG

## 2020-11-07 VITALS — DIASTOLIC BLOOD PRESSURE: 91 MMHG | SYSTOLIC BLOOD PRESSURE: 132 MMHG

## 2020-11-07 RX ADMIN — OXYCODONE HYDROCHLORIDE AND ACETAMINOPHEN PRN TAB: 5; 325 TABLET ORAL at 10:03

## 2020-11-07 RX ADMIN — SODIUM CHLORIDE, SODIUM LACTATE, POTASSIUM CHLORIDE, AND CALCIUM CHLORIDE SCH MLS/HR: .6; .31; .03; .02 INJECTION, SOLUTION INTRAVENOUS at 00:30

## 2020-11-07 RX ADMIN — IBUPROFEN PRN MG: 600 TABLET ORAL at 07:09

## 2020-11-07 RX ADMIN — Medication SCH MLS/HR: at 03:30

## 2020-11-07 RX ADMIN — IBUPROFEN PRN MG: 600 TABLET ORAL at 13:40

## 2020-11-07 RX ADMIN — OXYCODONE HYDROCHLORIDE AND ACETAMINOPHEN PRN TAB: 5; 325 TABLET ORAL at 03:36

## 2023-11-08 NOTE — TELEPHONE ENCOUNTER
Spoke with patient and notified of Dr. Velez's recommendations to have colposcopy. Procedure explained in detail. Pt voiced understanding and had no other questions    Adequate: hears normal conversation without difficulty